# Patient Record
Sex: FEMALE | Race: WHITE | NOT HISPANIC OR LATINO | Employment: FULL TIME | URBAN - METROPOLITAN AREA
[De-identification: names, ages, dates, MRNs, and addresses within clinical notes are randomized per-mention and may not be internally consistent; named-entity substitution may affect disease eponyms.]

---
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
## 2024-01-09 LAB
HEPATITIS B SURFACE ANTIGEN (EXTERNAL): NEGATIVE
HIV1+2 AB SERPL QL IA: NEGATIVE
RUBELLA ANTIBODY IGG (EXTERNAL): NORMAL
TREPONEMA PALLIDUM ANTIBODY (EXTERNAL): NONREACTIVE

## 2024-04-16 ENCOUNTER — HOSPITAL ENCOUNTER (EMERGENCY)
Facility: CLINIC | Age: 24
Discharge: HOME | End: 2024-04-16
Attending: EMERGENCY MEDICINE | Admitting: EMERGENCY MEDICINE
Payer: COMMERCIAL

## 2024-04-16 VITALS
SYSTOLIC BLOOD PRESSURE: 123 MMHG | OXYGEN SATURATION: 96 % | TEMPERATURE: 98.9 F | DIASTOLIC BLOOD PRESSURE: 83 MMHG | RESPIRATION RATE: 16 BRPM | HEART RATE: 88 BPM

## 2024-04-16 DIAGNOSIS — H66.92 ACUTE LEFT OTITIS MEDIA: ICD-10-CM

## 2024-04-16 PROCEDURE — 99283 EMERGENCY DEPT VISIT LOW MDM: CPT

## 2024-04-16 ASSESSMENT — COLUMBIA-SUICIDE SEVERITY RATING SCALE - C-SSRS
6. HAVE YOU EVER DONE ANYTHING, STARTED TO DO ANYTHING, OR PREPARED TO DO ANYTHING TO END YOUR LIFE?: NO
2. HAVE YOU ACTUALLY HAD ANY THOUGHTS OF KILLING YOURSELF IN THE PAST MONTH?: NO
1. IN THE PAST MONTH, HAVE YOU WISHED YOU WERE DEAD OR WISHED YOU COULD GO TO SLEEP AND NOT WAKE UP?: NO

## 2024-04-16 ASSESSMENT — ACTIVITIES OF DAILY LIVING (ADL): ADLS_ACUITY_SCORE: 33

## 2024-04-16 NOTE — ED TRIAGE NOTES
Pt is , presents with ear ache. Ear pain started , yesterday started to progressively getting worsen, feeling hearing has been worsening to the left ear. Currently pregnant 23 weeks, OB check normal in last visit per pt. ABC intact

## 2024-04-16 NOTE — ED PROVIDER NOTES
History     Chief Complaint:  Otalgia       HPI   Lamberto Hernandez is a 23 year old female who presents with left ear pain for 2 days.  States pain has been worsening.  Denies fevers.  States that her hearing has diminished over the last day or so.  Denies history of frequent ear infections.  States that she is currently 23 weeks pregnant, otherwise denies any chronic medical issues.  She has been taking Tylenol for her pain.      Independent Historian:   None - Patient Only    Review of External Notes:   Telephone note from today reviewed where the patient called for ear pain since 4/14/2024.      Medications:    amoxicillin-clavulanate (AUGMENTIN) 875-125 MG tablet  acetaminophen (TYLENOL) 325 MG tablet  albuterol (PROAIR HFA/PROVENTIL HFA/VENTOLIN HFA) 108 (90 Base) MCG/ACT inhaler  cyclobenzaprine (FLEXERIL) 5 MG tablet  ibuprofen (ADVIL/MOTRIN) 200 MG tablet  oxyCODONE IR (ROXICODONE) 5 MG tablet        Past Medical History:    No past medical history on file.    Past Surgical History:    No past surgical history on file.     Physical Exam   Patient Vitals for the past 24 hrs:   BP Temp Temp src Pulse Resp SpO2   04/16/24 0523 123/83 98.9  F (37.2  C) Oral 88 16 96 %        Physical Exam    General: Sitting on the ED chair  HEENT: Normocephalic, atraumatic, left TM bulge and injection, right TM clear  Cardiac: Warm and well perfused  Pulm: Breathing comfortably, no accessory muscle usage, no conversational dyspnea  MSK: No bony deformities  Skin: Warm and dry  Neuro: Moves all extremities  Psych: Pleasant mood and affect      Emergency Department Course     Emergency Department Course & Assessments:    Interventions:  Medications - No data to display     Assessments:  0845 initial evaluation    Independent Interpretation (X-rays, CTs, rhythm strip):  None    Consultations/Discussion of Management or Tests:  None        Social Determinants of Health affecting care:   None    Disposition:  The patient was  discharged.     Impression & Plan    CMS Diagnoses: None       Medical Decision Makin-year old pregnant female presents with left ear pain and diminished hearing as above.  Vital signs are stable.  Nontoxic on exam.  Exam is consistent with left otitis media.  Plan to treat for the same.  Patient discharged home, recommended PCP follow-up for further care.      Diagnosis:    ICD-10-CM    1. Acute left otitis media  H66.92            Discharge Medications:  New Prescriptions    AMOXICILLIN-CLAVULANATE (AUGMENTIN) 875-125 MG TABLET    Take 1 tablet by mouth 2 times daily for 10 days        2024   Kev Thompson MD King, Colin, MD  [phone]

## 2024-07-25 LAB — GROUP B STREPTOCOCCUS (EXTERNAL): NEGATIVE

## 2024-08-19 ENCOUNTER — HOSPITAL ENCOUNTER (INPATIENT)
Facility: CLINIC | Age: 24
LOS: 2 days | Discharge: HOME | End: 2024-08-21
Attending: STUDENT IN AN ORGANIZED HEALTH CARE EDUCATION/TRAINING PROGRAM | Admitting: STUDENT IN AN ORGANIZED HEALTH CARE EDUCATION/TRAINING PROGRAM
Payer: COMMERCIAL

## 2024-08-19 LAB
ABO/RH(D): NORMAL
ANTIBODY SCREEN: NEGATIVE
ERYTHROCYTE [DISTWIDTH] IN BLOOD BY AUTOMATED COUNT: 13.5 % (ref 10–15)
HCT VFR BLD AUTO: 37 % (ref 35–47)
HGB BLD-MCNC: 12.4 G/DL (ref 11.7–15.7)
MCH RBC QN AUTO: 30.8 PG (ref 26.5–33)
MCHC RBC AUTO-ENTMCNC: 33.5 G/DL (ref 31.5–36.5)
MCV RBC AUTO: 92 FL (ref 78–100)
PLATELET # BLD AUTO: 171 10E3/UL (ref 150–450)
RBC # BLD AUTO: 4.03 10E6/UL (ref 3.8–5.2)
SPECIMEN EXPIRATION DATE: NORMAL
WBC # BLD AUTO: 12 10E3/UL (ref 4–11)

## 2024-08-19 PROCEDURE — 250N000013 HC RX MED GY IP 250 OP 250 PS 637: Performed by: STUDENT IN AN ORGANIZED HEALTH CARE EDUCATION/TRAINING PROGRAM

## 2024-08-19 PROCEDURE — 85027 COMPLETE CBC AUTOMATED: CPT | Performed by: STUDENT IN AN ORGANIZED HEALTH CARE EDUCATION/TRAINING PROGRAM

## 2024-08-19 PROCEDURE — 86780 TREPONEMA PALLIDUM: CPT | Performed by: STUDENT IN AN ORGANIZED HEALTH CARE EDUCATION/TRAINING PROGRAM

## 2024-08-19 PROCEDURE — 86900 BLOOD TYPING SEROLOGIC ABO: CPT | Performed by: STUDENT IN AN ORGANIZED HEALTH CARE EDUCATION/TRAINING PROGRAM

## 2024-08-19 PROCEDURE — 120N000001 HC R&B MED SURG/OB

## 2024-08-19 RX ORDER — CITRIC ACID/SODIUM CITRATE 334-500MG
30 SOLUTION, ORAL ORAL ONCE
Status: DISCONTINUED | OUTPATIENT
Start: 2024-08-19 | End: 2024-08-20 | Stop reason: HOSPADM

## 2024-08-19 RX ORDER — NALOXONE HYDROCHLORIDE 0.4 MG/ML
0.2 INJECTION, SOLUTION INTRAMUSCULAR; INTRAVENOUS; SUBCUTANEOUS
Status: DISCONTINUED | OUTPATIENT
Start: 2024-08-19 | End: 2024-08-20 | Stop reason: HOSPADM

## 2024-08-19 RX ORDER — MISOPROSTOL 200 UG/1
800 TABLET ORAL
Status: DISCONTINUED | OUTPATIENT
Start: 2024-08-19 | End: 2024-08-20 | Stop reason: HOSPADM

## 2024-08-19 RX ORDER — TERBUTALINE SULFATE 1 MG/ML
0.25 INJECTION, SOLUTION SUBCUTANEOUS
Status: DISCONTINUED | OUTPATIENT
Start: 2024-08-19 | End: 2024-08-20 | Stop reason: HOSPADM

## 2024-08-19 RX ORDER — ONDANSETRON 2 MG/ML
4 INJECTION INTRAMUSCULAR; INTRAVENOUS EVERY 6 HOURS PRN
Status: DISCONTINUED | OUTPATIENT
Start: 2024-08-19 | End: 2024-08-20 | Stop reason: HOSPADM

## 2024-08-19 RX ORDER — METHYLERGONOVINE MALEATE 0.2 MG/ML
200 INJECTION INTRAVENOUS
Status: DISCONTINUED | OUTPATIENT
Start: 2024-08-19 | End: 2024-08-20 | Stop reason: HOSPADM

## 2024-08-19 RX ORDER — FENTANYL CITRATE 50 UG/ML
100 INJECTION, SOLUTION INTRAMUSCULAR; INTRAVENOUS
Status: DISCONTINUED | OUTPATIENT
Start: 2024-08-19 | End: 2024-08-20 | Stop reason: HOSPADM

## 2024-08-19 RX ORDER — HYDROXYZINE HYDROCHLORIDE 50 MG/1
50 TABLET, FILM COATED ORAL
Status: DISCONTINUED | OUTPATIENT
Start: 2024-08-19 | End: 2024-08-20 | Stop reason: HOSPADM

## 2024-08-19 RX ORDER — KETOROLAC TROMETHAMINE 30 MG/ML
30 INJECTION, SOLUTION INTRAMUSCULAR; INTRAVENOUS
Status: COMPLETED | OUTPATIENT
Start: 2024-08-19 | End: 2024-08-20

## 2024-08-19 RX ORDER — OXYTOCIN 10 [USP'U]/ML
10 INJECTION, SOLUTION INTRAMUSCULAR; INTRAVENOUS
Status: DISCONTINUED | OUTPATIENT
Start: 2024-08-19 | End: 2024-08-20 | Stop reason: ALTCHOICE

## 2024-08-19 RX ORDER — ONDANSETRON 4 MG/1
4 TABLET, ORALLY DISINTEGRATING ORAL EVERY 6 HOURS PRN
Status: DISCONTINUED | OUTPATIENT
Start: 2024-08-19 | End: 2024-08-20 | Stop reason: HOSPADM

## 2024-08-19 RX ORDER — ACETAMINOPHEN 325 MG/1
650 TABLET ORAL EVERY 4 HOURS PRN
Status: DISCONTINUED | OUTPATIENT
Start: 2024-08-19 | End: 2024-08-20 | Stop reason: HOSPADM

## 2024-08-19 RX ORDER — OXYTOCIN 10 [USP'U]/ML
10 INJECTION, SOLUTION INTRAMUSCULAR; INTRAVENOUS
Status: DISCONTINUED | OUTPATIENT
Start: 2024-08-19 | End: 2024-08-20 | Stop reason: HOSPADM

## 2024-08-19 RX ORDER — CARBOPROST TROMETHAMINE 250 UG/ML
250 INJECTION, SOLUTION INTRAMUSCULAR
Status: DISCONTINUED | OUTPATIENT
Start: 2024-08-19 | End: 2024-08-20 | Stop reason: HOSPADM

## 2024-08-19 RX ORDER — TRANEXAMIC ACID 10 MG/ML
1 INJECTION, SOLUTION INTRAVENOUS EVERY 30 MIN PRN
Status: DISCONTINUED | OUTPATIENT
Start: 2024-08-19 | End: 2024-08-20 | Stop reason: HOSPADM

## 2024-08-19 RX ORDER — LOPERAMIDE HCL 2 MG
2 CAPSULE ORAL
Status: DISCONTINUED | OUTPATIENT
Start: 2024-08-19 | End: 2024-08-20 | Stop reason: HOSPADM

## 2024-08-19 RX ORDER — OXYTOCIN/0.9 % SODIUM CHLORIDE 30/500 ML
100-340 PLASTIC BAG, INJECTION (ML) INTRAVENOUS CONTINUOUS PRN
Status: DISCONTINUED | OUTPATIENT
Start: 2024-08-19 | End: 2024-08-20 | Stop reason: ALTCHOICE

## 2024-08-19 RX ORDER — LOPERAMIDE HCL 2 MG
4 CAPSULE ORAL
Status: DISCONTINUED | OUTPATIENT
Start: 2024-08-19 | End: 2024-08-20 | Stop reason: HOSPADM

## 2024-08-19 RX ORDER — METOCLOPRAMIDE HYDROCHLORIDE 5 MG/ML
10 INJECTION INTRAMUSCULAR; INTRAVENOUS EVERY 6 HOURS PRN
Status: DISCONTINUED | OUTPATIENT
Start: 2024-08-19 | End: 2024-08-20 | Stop reason: HOSPADM

## 2024-08-19 RX ORDER — OXYTOCIN/0.9 % SODIUM CHLORIDE 30/500 ML
340 PLASTIC BAG, INJECTION (ML) INTRAVENOUS CONTINUOUS PRN
Status: DISCONTINUED | OUTPATIENT
Start: 2024-08-19 | End: 2024-08-20 | Stop reason: HOSPADM

## 2024-08-19 RX ORDER — VITAMIN A, VITAMIN C, VITAMIN D-3, VITAMIN E, VITAMIN B-1, VITAMIN B-2, NIACIN, VITAMIN B-6, CALCIUM, IRON, ZINC, COPPER 4000; 120; 400; 22; 1.84; 3; 20; 10; 1; 12; 200; 27; 25; 2 [IU]/1; MG/1; [IU]/1; MG/1; MG/1; MG/1; MG/1; MG/1; MG/1; UG/1; MG/1; MG/1; MG/1; MG/1
TABLET ORAL DAILY
COMMUNITY

## 2024-08-19 RX ORDER — ASPIRIN 81 MG/1
81 TABLET ORAL DAILY
Status: ON HOLD | COMMUNITY
End: 2024-08-20

## 2024-08-19 RX ORDER — PROCHLORPERAZINE 25 MG
25 SUPPOSITORY, RECTAL RECTAL EVERY 12 HOURS PRN
Status: DISCONTINUED | OUTPATIENT
Start: 2024-08-19 | End: 2024-08-20 | Stop reason: HOSPADM

## 2024-08-19 RX ORDER — NALOXONE HYDROCHLORIDE 0.4 MG/ML
0.4 INJECTION, SOLUTION INTRAMUSCULAR; INTRAVENOUS; SUBCUTANEOUS
Status: DISCONTINUED | OUTPATIENT
Start: 2024-08-19 | End: 2024-08-20 | Stop reason: HOSPADM

## 2024-08-19 RX ORDER — MISOPROSTOL 200 UG/1
400 TABLET ORAL
Status: DISCONTINUED | OUTPATIENT
Start: 2024-08-19 | End: 2024-08-20 | Stop reason: HOSPADM

## 2024-08-19 RX ORDER — MISOPROSTOL 100 UG/1
25 TABLET ORAL
Status: DISCONTINUED | OUTPATIENT
Start: 2024-08-19 | End: 2024-08-20 | Stop reason: HOSPADM

## 2024-08-19 RX ORDER — SODIUM CHLORIDE, SODIUM LACTATE, POTASSIUM CHLORIDE, CALCIUM CHLORIDE 600; 310; 30; 20 MG/100ML; MG/100ML; MG/100ML; MG/100ML
INJECTION, SOLUTION INTRAVENOUS CONTINUOUS
Status: DISCONTINUED | OUTPATIENT
Start: 2024-08-19 | End: 2024-08-20 | Stop reason: HOSPADM

## 2024-08-19 RX ORDER — CITRIC ACID/SODIUM CITRATE 334-500MG
30 SOLUTION, ORAL ORAL
Status: DISCONTINUED | OUTPATIENT
Start: 2024-08-19 | End: 2024-08-20 | Stop reason: HOSPADM

## 2024-08-19 RX ORDER — PROCHLORPERAZINE MALEATE 10 MG
10 TABLET ORAL EVERY 6 HOURS PRN
Status: DISCONTINUED | OUTPATIENT
Start: 2024-08-19 | End: 2024-08-20 | Stop reason: HOSPADM

## 2024-08-19 RX ORDER — IBUPROFEN 800 MG/1
800 TABLET, FILM COATED ORAL
Status: COMPLETED | OUTPATIENT
Start: 2024-08-19 | End: 2024-08-20

## 2024-08-19 RX ORDER — METOCLOPRAMIDE 10 MG/1
10 TABLET ORAL EVERY 6 HOURS PRN
Status: DISCONTINUED | OUTPATIENT
Start: 2024-08-19 | End: 2024-08-20 | Stop reason: HOSPADM

## 2024-08-19 RX ADMIN — MISOPROSTOL 25 MCG: 100 TABLET ORAL at 17:50

## 2024-08-19 RX ADMIN — HYDROXYZINE HYDROCHLORIDE 50 MG: 50 TABLET, FILM COATED ORAL at 22:01

## 2024-08-19 RX ADMIN — MISOPROSTOL 25 MCG: 100 TABLET ORAL at 22:00

## 2024-08-19 RX ADMIN — MISOPROSTOL 25 MCG: 100 TABLET ORAL at 20:05

## 2024-08-19 ASSESSMENT — ACTIVITIES OF DAILY LIVING (ADL)
ADLS_ACUITY_SCORE: 18
ADLS_ACUITY_SCORE: 35
ADLS_ACUITY_SCORE: 18

## 2024-08-19 NOTE — PLAN OF CARE
Pt sent from clinic for possible ripening/induction- audible decel's in the clinic. Pt feels baby moving-place on monitor- will page MD for orders

## 2024-08-19 NOTE — H&P
[facility]   Labor & Delivery H&P    Lamberto Hernandez YOB: 2000   MRN [id number] Primary OB: [facility] HPI   Lamberto Hernandez is a 24 year old  at 40w4d admitted after nonreassuring NST in clinic today with 3 subtle decelerations. Overall feeling well, reports good fetal movement. No vaginal bleeding or leaking fluid.      PREGNANCY HISTORY   OB PROBLEM LIST  #. Asthma  #. Family Hx Pre E    OB History    Para Term  AB Living   1 0 0 0 0 0   SAB IAB Ectopic Multiple Live Births   0 0 0 0 0      # Outcome Date GA Lbr Heri/2nd Weight Sex Type Anes PTL Lv   1 Current              MATERNAL MEDICAL HISTORY     Past Medical History:   Diagnosis Date    Allergic reaction     PEANUTS- -anaphylactic    Uncomplicated asthma      History reviewed. No pertinent surgical history.    History reviewed. No pertinent family history.    Social History     Tobacco Use    Smoking status: Never   Substance Use Topics    Alcohol use: Never     Medications Prior to Admission   Medication Sig Dispense Refill Last Dose    acetaminophen (TYLENOL) 325 MG tablet Take 2 tablets (650 mg) by mouth every 4 hours as needed for mild pain 100 tablet 0 2024    albuterol (PROAIR HFA/PROVENTIL HFA/VENTOLIN HFA) 108 (90 Base) MCG/ACT inhaler Inhale 2 puffs into the lungs as needed for shortness of breath / dyspnea or wheezing   More than a month    aspirin 81 MG EC tablet Take 81 mg by mouth daily   2024    Prenatal Vit-Fe Fumarate-FA (PRENATAL MULTIVITAMIN  PLUS IRON) 27-1 MG TABS Take by mouth daily   2024     Allergies   Allergen Reactions    Eggs [Chicken-Derived Products (Egg)] Unknown    Grass Hives    Peanuts [Nuts] Hives    Pollen Extract Other (See Comments)    Tomato Unknown      OBJECTIVE     Vitals:    24 1623 24 1634 24 1638   BP: 136/68     Resp:   16   Temp:   98  F (36.7  C)   TempSrc:   Axillary   Weight:  93.4 kg (206 lb)    Height:  1.797 m  "(5' 10.75\")      Physical Exam  General: Alert, in no acute distress, resting comfortably in bed.   Neuro: Grossly normal to observation.  Psych: Alert, oriented, affect appropriate.  Cardiovascular: Normal rate, wwp.   Respiratory: Nonlabored breathing, equal chest rise/fall bilaterally.   Abdomen: Gravid, nontender.   Skin: Color, texture, turgor normal. No concerning rashes or lesions.    SVE Trend  1700 /-3    Fetal Monitoring  FHT: baseline 130, moderate variability, 15x15 accels, no decels  [place]: irregular    ASSESSMENT & PLAN   Lamberto Hernandez is a 24 year old  at 40w4d by LMP c/w 8wk US admitted for induction of labor.    #. Induction of Labor:   - IOL for nonreassuring fetal monitoring at term, impending late term pregnancy. Fetal monitoring now reassuring.   - Risks, benefits, alternatives of IOL again explained. Reviewed risk of fetal intolerance of labor, Lamberto is open to  delivery if indicated. All questions answered.   - Initial Tobar score unfavorable, will proceed with PO Misoprostol for cervical ripening.   - Pitocin per protocol for augmentation once sufficiently ripened. AROM as indicated.   - General diet, up ad juan. Pain control per patient preference.   - PPH Risk: elevated (induction), Hemabate contraindicated due to history of asthma    #. Fetal Well Being:   - Cephalic, GBS negative  - Continuous external fetal monitoring    #. Prenatal Care:   - OB labs reviewed: A, Rubella immune, Heb B Ag non-reactive, HIV negative, RPR negative  - Genetics: NIPS neg  - Anatomy ultrasound: normal   - Rh positive, Rhogam not indicated  - GCT 84, along with hgb 11.5, RPR NR  - declined flu and COVID vaccines. S/p Tdap 24  - GBS negative  - Feed: breast and bottle, has script  - Contraception: likely COCPs, but unsure if she will use contraception. Discussed IPI of >18 months   - Peds: likely [facility]     #. Asthma:  - Rare, triggers are dog dander   - Avoid Hemabate    #. " Disposition: inpatient    Lauren MacNeill, MD [facility]  [phone]  08/19/2024 5:52 PM

## 2024-08-19 NOTE — PROVIDER NOTIFICATION
08/19/24 1726   Provider Notification   Provider Name/Title Clement   Method of Notification At Bedside   Request Evaluate in Person   Notification Reason Patient Arrived;Status Update     MD at bedside to review plan of care, answer questions

## 2024-08-20 LAB — T PALLIDUM AB SER QL: NONREACTIVE

## 2024-08-20 PROCEDURE — 120N000001 HC R&B MED SURG/OB

## 2024-08-20 PROCEDURE — 250N000013 HC RX MED GY IP 250 OP 250 PS 637: Performed by: OBSTETRICS & GYNECOLOGY

## 2024-08-20 PROCEDURE — 250N000011 HC RX IP 250 OP 636: Performed by: STUDENT IN AN ORGANIZED HEALTH CARE EDUCATION/TRAINING PROGRAM

## 2024-08-20 PROCEDURE — 258N000003 HC RX IP 258 OP 636: Performed by: OBSTETRICS & GYNECOLOGY

## 2024-08-20 PROCEDURE — 722N000001 HC LABOR CARE VAGINAL DELIVERY SINGLE

## 2024-08-20 PROCEDURE — 250N000009 HC RX 250: Performed by: STUDENT IN AN ORGANIZED HEALTH CARE EDUCATION/TRAINING PROGRAM

## 2024-08-20 PROCEDURE — 250N000013 HC RX MED GY IP 250 OP 250 PS 637: Performed by: STUDENT IN AN ORGANIZED HEALTH CARE EDUCATION/TRAINING PROGRAM

## 2024-08-20 RX ORDER — MISOPROSTOL 200 UG/1
400 TABLET ORAL
Status: DISCONTINUED | OUTPATIENT
Start: 2024-08-20 | End: 2024-08-21 | Stop reason: HOSPADM

## 2024-08-20 RX ORDER — FENTANYL CITRATE-0.9 % NACL/PF 10 MCG/ML
100 PLASTIC BAG, INJECTION (ML) INTRAVENOUS EVERY 5 MIN PRN
Status: DISCONTINUED | OUTPATIENT
Start: 2024-08-20 | End: 2024-08-20 | Stop reason: HOSPADM

## 2024-08-20 RX ORDER — IBUPROFEN 800 MG/1
800 TABLET, FILM COATED ORAL EVERY 6 HOURS PRN
Status: DISCONTINUED | OUTPATIENT
Start: 2024-08-20 | End: 2024-08-21 | Stop reason: HOSPADM

## 2024-08-20 RX ORDER — OXYTOCIN 10 [USP'U]/ML
10 INJECTION, SOLUTION INTRAMUSCULAR; INTRAVENOUS
Status: DISCONTINUED | OUTPATIENT
Start: 2024-08-20 | End: 2024-08-21 | Stop reason: HOSPADM

## 2024-08-20 RX ORDER — OXYTOCIN/0.9 % SODIUM CHLORIDE 30/500 ML
340 PLASTIC BAG, INJECTION (ML) INTRAVENOUS CONTINUOUS PRN
Status: DISCONTINUED | OUTPATIENT
Start: 2024-08-20 | End: 2024-08-21 | Stop reason: HOSPADM

## 2024-08-20 RX ORDER — BISACODYL 10 MG
10 SUPPOSITORY, RECTAL RECTAL DAILY PRN
Status: DISCONTINUED | OUTPATIENT
Start: 2024-08-20 | End: 2024-08-21 | Stop reason: HOSPADM

## 2024-08-20 RX ORDER — TRANEXAMIC ACID 10 MG/ML
1 INJECTION, SOLUTION INTRAVENOUS EVERY 30 MIN PRN
Status: DISCONTINUED | OUTPATIENT
Start: 2024-08-20 | End: 2024-08-21 | Stop reason: HOSPADM

## 2024-08-20 RX ORDER — CARBOPROST TROMETHAMINE 250 UG/ML
250 INJECTION, SOLUTION INTRAMUSCULAR
Status: DISCONTINUED | OUTPATIENT
Start: 2024-08-20 | End: 2024-08-21 | Stop reason: HOSPADM

## 2024-08-20 RX ORDER — METHYLERGONOVINE MALEATE 0.2 MG/ML
200 INJECTION INTRAVENOUS
Status: DISCONTINUED | OUTPATIENT
Start: 2024-08-20 | End: 2024-08-21 | Stop reason: HOSPADM

## 2024-08-20 RX ORDER — LOPERAMIDE HCL 2 MG
2 CAPSULE ORAL
Status: DISCONTINUED | OUTPATIENT
Start: 2024-08-20 | End: 2024-08-21 | Stop reason: HOSPADM

## 2024-08-20 RX ORDER — NALOXONE HYDROCHLORIDE 0.4 MG/ML
0.2 INJECTION, SOLUTION INTRAMUSCULAR; INTRAVENOUS; SUBCUTANEOUS
Status: DISCONTINUED | OUTPATIENT
Start: 2024-08-20 | End: 2024-08-21 | Stop reason: HOSPADM

## 2024-08-20 RX ORDER — MISOPROSTOL 200 UG/1
800 TABLET ORAL
Status: DISCONTINUED | OUTPATIENT
Start: 2024-08-20 | End: 2024-08-21 | Stop reason: HOSPADM

## 2024-08-20 RX ORDER — NALBUPHINE HYDROCHLORIDE 10 MG/ML
2.5-5 INJECTION, SOLUTION INTRAMUSCULAR; INTRAVENOUS; SUBCUTANEOUS EVERY 6 HOURS PRN
Status: DISCONTINUED | OUTPATIENT
Start: 2024-08-20 | End: 2024-08-21 | Stop reason: HOSPADM

## 2024-08-20 RX ORDER — HYDROCORTISONE 25 MG/G
CREAM TOPICAL 3 TIMES DAILY PRN
Status: DISCONTINUED | OUTPATIENT
Start: 2024-08-20 | End: 2024-08-21 | Stop reason: HOSPADM

## 2024-08-20 RX ORDER — NALOXONE HYDROCHLORIDE 0.4 MG/ML
0.4 INJECTION, SOLUTION INTRAMUSCULAR; INTRAVENOUS; SUBCUTANEOUS
Status: DISCONTINUED | OUTPATIENT
Start: 2024-08-20 | End: 2024-08-21 | Stop reason: HOSPADM

## 2024-08-20 RX ORDER — LOPERAMIDE HCL 2 MG
4 CAPSULE ORAL
Status: DISCONTINUED | OUTPATIENT
Start: 2024-08-20 | End: 2024-08-21 | Stop reason: HOSPADM

## 2024-08-20 RX ORDER — LIDOCAINE HYDROCHLORIDE 10 MG/ML
INJECTION, SOLUTION EPIDURAL; INFILTRATION; INTRACAUDAL; PERINEURAL
Status: DISCONTINUED
Start: 2024-08-20 | End: 2024-08-20 | Stop reason: WASHOUT

## 2024-08-20 RX ORDER — ACETAMINOPHEN 325 MG/1
650 TABLET ORAL EVERY 4 HOURS PRN
Status: DISCONTINUED | OUTPATIENT
Start: 2024-08-20 | End: 2024-08-21 | Stop reason: HOSPADM

## 2024-08-20 RX ORDER — MODIFIED LANOLIN
OINTMENT (GRAM) TOPICAL
Status: DISCONTINUED | OUTPATIENT
Start: 2024-08-20 | End: 2024-08-21 | Stop reason: HOSPADM

## 2024-08-20 RX ORDER — DOCUSATE SODIUM 100 MG/1
100 CAPSULE, LIQUID FILLED ORAL DAILY
Status: DISCONTINUED | OUTPATIENT
Start: 2024-08-20 | End: 2024-08-21 | Stop reason: HOSPADM

## 2024-08-20 RX ADMIN — MISOPROSTOL 25 MCG: 100 TABLET ORAL at 00:08

## 2024-08-20 RX ADMIN — SODIUM CHLORIDE, POTASSIUM CHLORIDE, SODIUM LACTATE AND CALCIUM CHLORIDE 1000 ML: 600; 310; 30; 20 INJECTION, SOLUTION INTRAVENOUS at 07:04

## 2024-08-20 RX ADMIN — Medication 340 ML/HR: at 11:22

## 2024-08-20 RX ADMIN — FENTANYL CITRATE 100 MCG: 0.05 INJECTION, SOLUTION INTRAMUSCULAR; INTRAVENOUS at 10:09

## 2024-08-20 RX ADMIN — KETOROLAC TROMETHAMINE 30 MG: 30 INJECTION, SOLUTION INTRAMUSCULAR at 14:03

## 2024-08-20 RX ADMIN — MISOPROSTOL 25 MCG: 100 TABLET ORAL at 02:13

## 2024-08-20 RX ADMIN — MISOPROSTOL 25 MCG: 100 TABLET ORAL at 09:30

## 2024-08-20 RX ADMIN — FENTANYL CITRATE 100 MCG: 0.05 INJECTION, SOLUTION INTRAMUSCULAR; INTRAVENOUS at 11:26

## 2024-08-20 RX ADMIN — MISOPROSTOL 25 MCG: 100 TABLET ORAL at 04:32

## 2024-08-20 RX ADMIN — MISOPROSTOL 25 MCG: 100 TABLET ORAL at 07:16

## 2024-08-20 RX ADMIN — DOCUSATE SODIUM 100 MG: 100 CAPSULE, LIQUID FILLED ORAL at 15:02

## 2024-08-20 ASSESSMENT — ACTIVITIES OF DAILY LIVING (ADL)
ADLS_ACUITY_SCORE: 18

## 2024-08-20 NOTE — PROVIDER NOTIFICATION
08/20/24 0700   Provider Notification   Provider Name/Title    Method of Notification In Department   Notification Reason Status Update        updated on FHR tracing, tracing reviewed with MD. Plan for 1L LR bolus and SVE. Will update MD with SVE.

## 2024-08-20 NOTE — PLAN OF CARE
VSS on room air. Fundus/lochia/incision WDL. Voiding appropriately and ambulating independently. Pain adequately controlled with PRN medications, gave one dose of toradol shortly after transfer to . Also using ice and tucks. Formula feeding infant, 10mL each feed. S/o at bedside, supportive. Parents interactive with infant and attentive to her cues.    Goal Outcome Evaluation:      Plan of Care Reviewed With: patient    Overall Patient Progress: improving    Problem: Adult Inpatient Plan of Care  Goal: Optimal Comfort and Wellbeing  Outcome: Progressing  Intervention: Monitor Pain and Promote Comfort  Recent Flowsheet Documentation  Taken 8/20/2024 1423 by Jessenia Ramirez, RN  Pain Management Interventions:   pain management plan reviewed with patient/caregiver   medication offered but refused  Taken 8/20/2024 1403 by Jessenia Ramirez, RN  Pain Management Interventions:   medication (see MAR)   heat applied     Problem: Adult Inpatient Plan of Care  Goal: Readiness for Transition of Care  Outcome: Progressing     Problem: Postpartum (Vaginal Delivery)  Goal: Optimal Pain Control and Function  Outcome: Progressing  Intervention: Prevent or Manage Pain  Recent Flowsheet Documentation  Taken 8/20/2024 1423 by Jessenia Ramirez, RN  Pain Management Interventions:   pain management plan reviewed with patient/caregiver   medication offered but refused  Taken 8/20/2024 1403 by Jessenia Ramirez, RN  Pain Management Interventions:   medication (see MAR)   heat applied

## 2024-08-20 NOTE — DISCHARGE SUMMARY
[facility] Discharge Summary    Lamberto Hernandez MRN# [id number]   Age: 24 year old YOB: 2000     Date of Admission:  2024  Date of Discharge::  2024  Admitting Physician:  Chayo Crawford MD  Discharge Physician:  Yelitza Whitman MD          Admission Diagnoses:   -IUP at 40w5d          Discharge Diagnosis:     -IUP at 40w5d, now delivered          Procedures:     Procedure(s): -            Medications Prior to Admission:     Medications Prior to Admission   Medication Sig Dispense Refill Last Dose    acetaminophen (TYLENOL) 325 MG tablet Take 2 tablets (650 mg) by mouth every 4 hours as needed for mild pain 100 tablet 0 2024    albuterol (PROAIR HFA/PROVENTIL HFA/VENTOLIN HFA) 108 (90 Base) MCG/ACT inhaler Inhale 2 puffs into the lungs as needed for shortness of breath / dyspnea or wheezing   More than a month    aspirin 81 MG EC tablet Take 81 mg by mouth daily   2024    Prenatal Vit-Fe Fumarate-FA (PRENATAL MULTIVITAMIN  PLUS IRON) 27-1 MG TABS Take by mouth daily   2024             Discharge Medications:     Current Discharge Medication List        CONTINUE these medications which have NOT CHANGED    Details   acetaminophen (TYLENOL) 325 MG tablet Take 2 tablets (650 mg) by mouth every 4 hours as needed for mild pain  Qty: 100 tablet, Refills: 0    Associated Diagnoses: Neck sprain, initial encounter      albuterol (PROAIR HFA/PROVENTIL HFA/VENTOLIN HFA) 108 (90 Base) MCG/ACT inhaler Inhale 2 puffs into the lungs as needed for shortness of breath / dyspnea or wheezing      aspirin 81 MG EC tablet Take 81 mg by mouth daily      Prenatal Vit-Fe Fumarate-FA (PRENATAL MULTIVITAMIN  PLUS IRON) 27-1 MG TABS Take by mouth daily                  Brief Admission History   Lamberto Hernandez is a 24 year old  at 40w4d admitted after nonreassuring NST in clinic today with 3 subtle decelerations. Overall feeling well, reports good fetal movement. No vaginal  bleeding or leaking fluid.     Lamberto Hernandez is a 24 year old  at 40w4d by LMP c/w 8wk US admitted for induction of labor.     #. Induction of Labor:   - IOL for nonreassuring fetal monitoring at term, impending late term pregnancy. Fetal monitoring now reassuring.   - Risks, benefits, alternatives of IOL again explained. Reviewed risk of fetal intolerance of labor, Lamberto is open to  delivery if indicated. All questions answered.   - Initial Tobar score unfavorable, will proceed with PO Misoprostol for cervical ripening.   - Pitocin per protocol for augmentation once sufficiently ripened. AROM as indicated.   - General diet, up ad juan. Pain control per patient preference.   - PPH Risk: elevated (induction), Hemabate contraindicated due to history of asthma     #. Fetal Well Being:   - Cephalic, GBS negative  - Continuous external fetal monitoring     #. Prenatal Care:   - OB labs reviewed: A, Rubella immune, Heb B Ag non-reactive, HIV negative, RPR negative  - Genetics: NIPS neg  - Anatomy ultrasound: normal   - Rh positive, Rhogam not indicated  - GCT 84, along with hgb 11.5, RPR NR  - declined flu and COVID vaccines. S/p Tdap 24  - GBS negative  - Feed: breast and bottle, has script  - Contraception: likely COCPs, but unsure if she will use contraception. Discussed IPI of >18 months   - Peds: likely [facility]      #. Asthma:  - Rare, triggers are dog dander   - Avoid Hemabate     #. Disposition: inpatient         Brief Intrapartum Course:   Lamberto Hernandez is a 24 year old  who was admitted at 40w5d for nonreassuring NST (spontaneous decels; done in office for late term gestation) .  Pregnancy was complicated by asthma, GBS neg, A pos.  She underwent cervical ripening with cytotec PO.  SROM occurred notable for clera fluid. She progressed to fully and began pushing effectively. Pelvis was noted to be adequate.  She did not receive epidural, as she was found to be complete. She  delivered a viable female infant with APGARs of 9 and 9, respectively.  Head delivered in an DEEP position, restituted to ROT and delivered without difficulty.  Left anterior shoulder delivered first, followed by right posterior shoulder without complication, and then rest of body. Spontaneous delivery of a placenta with a 3-V cord ensued shortly thereafter.  Placenta was examined and noted to be intact. She received IV pitocin as a uterotonic agent.  Exam of the perineum revealed bilateral periurethral lacerations; hemostatic, no repair needed.  EBL 200cc.             Hospital Course:   The patient's hospital course was unremarkable.  On discharge, her pain was well controlled. Vaginal bleeding is similar to peak menstrual flow.  Voiding without difficulty.  Ambulating well and tolerating a normal diet.  No fever.  Formula feeding well.  Infant is stable. She was discharged on post-partum day #1..    Post-partum hemoglobin:   Hemoglobin   Date Value Ref Range Status   08/19/2024 12.4 11.7 - 15.7 g/dL Final   08/14/2018 13.8 11.7 - 15.7 g/dL Final             Discharge Instructions and Follow-Up:     Discharge diet: Regular   Discharge activity: Pelvic rest for 6 weeks including no sexual intercourse, tampons, or douching.   Discharge follow-up: Follow up with your primary OB for a routine postpartum visit in 6 weeks           Discharge Disposition:     Discharged to home     Anne Marie Furuseth, MD [facility]

## 2024-08-20 NOTE — PROGRESS NOTES
Patient is comfortable and resting.  Denies LOF or VB.  +FM.  Contractions are mild.    FHT: 130/mod/+accel/-decel  [place]: q 3-4 min  SVE: 1.5/70/-2, exam per nurse    A&P: 23yo  at 40+5 wga, IOL for spontaneous decels   - IOL: Cont PO cytotec; she has received 7 doses thus far.   - Epidural upon request  - FHT: cat I currently.  She had 3 decels last hour; improved with position change and IV fluid bolus.  Cont CEFM.  No indication for expedited delivery.  - Asthma: avoid Hemabate  - Family h/o preE: Bps normal  - A pos  - GBS neg    - PP contraception: COCPs vs condoms/NFP  - Dispo: anticipate

## 2024-08-20 NOTE — PROVIDER NOTIFICATION
08/20/24 0651   Provider Notification   Provider Name/Title    Method of Notification Electronic Page   Notification Reason Decels;Status Update       Vocera page sent: FHR moderate variability, accels present, occasional variable and late decels throughout night. Since 0620 FHR late decel x3, prolonged decel for 3 mins to [place] of 120bpm. Pt repositioned and no more decels noted with following ctx's. Cytotec x6 doses given, currently holding dose due at 0630.    MD paged back: Let's give a 1L fluid bolus and check her cervix.

## 2024-08-20 NOTE — L&D DELIVERY NOTE
Lamberto Hernandez is a 24 year old  who was admitted at 40w5d for nonreassuring NST (spontaneous decels; done in office for late term gestation) .  Pregnancy was complicated by asthma, GBS neg, A pos.  She underwent cervical ripening with cytotec PO.  SROM occurred notable for clera fluid. She progressed to fully and began pushing effectively. Pelvis was noted to be adequate.  She did not receive epidural, as she was found to be complete. She delivered a viable female infant with APGARs of 9 and 9, respectively.  Head delivered in an DEEP position, restituted to ROT and delivered without difficulty.  Left anterior shoulder delivered first, followed by right posterior shoulder without complication, and then rest of body. Spontaneous delivery of a placenta with a 3-V cord ensued shortly thereafter.  Placenta was examined and noted to be intact. She received IV pitocin as a uterotonic agent.  Exam of the perineum revealed bilateral periurethral lacerations; hemostatic, no repair needed.  EBL 200cc.      Brittnee Lundberg MD   2024, 11:40 AM     Delivery Summary    Lamberto Hernandez MRN# [id number]   Age: 24 year old YOB: 2000          David Female-Lamberto [[id number]]      Labor Event Times      Active labor onset date: 24 Onset time: 10:20 AM   Dilation complete date: 24 Complete time: 11:00 AM   Start pushing date/time: 2024 1111          Labor Events     labor?: No   steroids: None  Labor Type: Induction/Cervical ripening  Predominate monitoring during 1st stage: continuous electronic fetal monitoring       Rupture date/time: 24 1000   Rupture type: Spontaneous Rupture of Membranes  Fluid color: Clear  Fluid odor: Normal     Induction: Misoprostol  Induction date/time:      Cervical ripening date/time: 24 1750         Augmentation: None       Delivery/Placenta Date and Time      Delivery Date: 24 Delivery Time: 11:20 AM   Placenta Date/Time:  2024 11:21 AM  Oxytocin given at the time of delivery: after delivery of baby  Delivering clinician: Brittnee Lundberg MD   Other personnel present at delivery:  Provider Role   May Pop RN Clarens, Johanna, RN              Vaginal Counts       Initial count performed by 2 team members:  Two Team Members   Dr. Toño Olvera, RN         Needles Suture Needles Sponges (RETIRED) Instruments   Initial counts 2  5    Added to count       Relief counts       Final counts               Placed during labor Accounted for at the end of labor   FSE No NA   IUPC No NA   Cervidil No NA                             Apgars    Living status: Living   1 Minute 5 Minute 10 Minute 15 Minute 20 Minute   Skin color: 1  1       Heart rate: 2  2       Reflex irritability: 2  2       Muscle tone: 2  2       Respiratory effort: 2  2       Total: 9  9       Apgars assigned by: LUIZ OLVERA       Cord      Vessels: 3 Vessels    Cord Complications: None               Cord Blood Disposition: Discard    Gases Sent?: No    Delayed cord clamping?: Yes    Cord Clamping Delay (seconds):  seconds    Stem cell collection?: No           Indore Resuscitation    Methods: None       Labor Events and Shoulder Dystocia    Fetal Tracing Prior to Delivery: Category 1  Shoulder dystocia present?: Neg       Delivery (Maternal) (Provider to Complete) ([id number])    Episiotomy: None  Perineal lacerations: None      Periurethral laceration: bilateral Repaired?: No   Est. blood loss (mL): 200  Genital tract inspection done: Pos       Blood Loss  Mother: Lamberto Hernandez #[id number]     Start of Mother's Information      Delivery Blood Loss  24 1020 - 24 1140      EBL (mL) Hospital Encounter 200 mL    Total  200 mL               End of Mother's Information  Mother: Lamberto Hernandez #[id number]                Delivery - Provider to Complete ([id number])    Delivering clinician: Brittnee Lundberg MD  Delivery Type (Choose the 1 that  will go to the Birth History): Vaginal, Spontaneous                         Other personnel:  Provider Role   May Pop RN Clarens, Johanna, RN                     Placenta    Date/Time: 8/20/2024 11:21 AM  Removal: Spontaneous  Disposition: Hospital disposal             Anesthesia    Method: INTRAVENOUS                     Presentation and Position    Presentation: Vertex    Position: Right Occiput Anterior                     Brittnee Lundberg MD

## 2024-08-20 NOTE — PROGRESS NOTES
Data: Patient transferred to  432 via wheelchair at 1400. Baby transferred via mothers arms.  Action: Receiving unit notified of transfer: Yes. Patient and family notified of room change. Report given to Jessenia DEE at 1400. Belongings sent to receiving unit. Accompanied by Registered Nurse. Oriented patient to surroundings. Call light within reach. ID bands double-checked with receiving RN.  Response: Patient tolerated transfer and is stable.

## 2024-08-20 NOTE — PROVIDER NOTIFICATION
08/20/24 1100   Provider Notification   Provider Name/Title Dr. Lundberg   Method of Notification Electronic Page  ([email or web address])   Request Attend Delivery     10/100/+1    MD coming to the unit

## 2024-08-21 VITALS
BODY MASS INDEX: 26.85 KG/M2 | DIASTOLIC BLOOD PRESSURE: 75 MMHG | WEIGHT: 191.8 LBS | HEART RATE: 74 BPM | SYSTOLIC BLOOD PRESSURE: 130 MMHG | HEIGHT: 71 IN | TEMPERATURE: 97.6 F | RESPIRATION RATE: 18 BRPM

## 2024-08-21 LAB — HGB BLD-MCNC: 10.4 G/DL (ref 11.7–15.7)

## 2024-08-21 PROCEDURE — 36415 COLL VENOUS BLD VENIPUNCTURE: CPT | Performed by: OBSTETRICS & GYNECOLOGY

## 2024-08-21 PROCEDURE — 85018 HEMOGLOBIN: CPT | Performed by: OBSTETRICS & GYNECOLOGY

## 2024-08-21 PROCEDURE — 250N000013 HC RX MED GY IP 250 OP 250 PS 637: Performed by: OBSTETRICS & GYNECOLOGY

## 2024-08-21 RX ORDER — IBUPROFEN 800 MG/1
800 TABLET, FILM COATED ORAL EVERY 6 HOURS PRN
Qty: 40 TABLET | Refills: 1 | Status: SHIPPED | OUTPATIENT
Start: 2024-08-21

## 2024-08-21 RX ORDER — DOCUSATE SODIUM 100 MG/1
100 CAPSULE, LIQUID FILLED ORAL DAILY
Qty: 30 CAPSULE | Refills: 0 | Status: SHIPPED | OUTPATIENT
Start: 2024-08-22

## 2024-08-21 RX ORDER — MODIFIED LANOLIN
OINTMENT (GRAM) TOPICAL
Qty: 7 G | Refills: 3 | Status: SHIPPED | OUTPATIENT
Start: 2024-08-21

## 2024-08-21 RX ADMIN — ACETAMINOPHEN 650 MG: 325 TABLET, FILM COATED ORAL at 02:50

## 2024-08-21 RX ADMIN — IBUPROFEN 800 MG: 800 TABLET ORAL at 02:51

## 2024-08-21 RX ADMIN — DOCUSATE SODIUM 100 MG: 100 CAPSULE, LIQUID FILLED ORAL at 08:40

## 2024-08-21 ASSESSMENT — ACTIVITIES OF DAILY LIVING (ADL)
ADLS_ACUITY_SCORE: 18

## 2024-08-21 NOTE — PROGRESS NOTES
"Patient Name:  aLmberto Hernandez   MRN:  [id number]  Age:  24 year old    YOB: 2000      POSTPARTUM PROGRESS NOTE    Pt is PPD#1 s/p vaginal delivery.  She is doing well without complaints.  Pt is ambulating, voiding, tolerating a regular diet.  Pain is well controlled and lochia is within normal limits.  She is formula feeding.  Baby is doing well.    Objective:    Temp:  [97.9  F (36.6  C)-98.1  F (36.7  C)] 98.1  F (36.7  C)  Pulse:  [75-83] 81  Resp:  [16-18] 16  BP: (110-144)/(55-89) 138/66  206 lbs 0 oz    General Appearance:  NAD  Lungs:  unlabored  Cardiovascular:  RRR  Abdomen:  nontender, nondistended  Fundus:  firm, below the umbilicus      Lower extremities:  trace symmetric edema    Lab Review:    ABO/RH(D)   Date Value Ref Range Status   2024 A POS  Final     Hemoglobin   Date Value Ref Range Status   2024 12.4 11.7 - 15.7 g/dL Final   2018 13.8 11.7 - 15.7 g/dL Final     Hematocrit   Date Value Ref Range Status   2024 37.0 35.0 - 47.0 % Final   2018 41.7 35.0 - 47.0 % Final       Lab Results   Component Value Date    WBC 12.0 2024    WBC 14.8 2018     Lab Results   Component Value Date    RBC 4.03 2024    RBC 4.61 2018     Lab Results   Component Value Date    HGB 12.4 2024    HGB 13.8 2018     Lab Results   Component Value Date    HCT 37.0 2024    HCT 41.7 2018     No components found for: \"MCT\"  Lab Results   Component Value Date    MCV 92 2024    MCV 91 2018     Lab Results   Component Value Date    MCH 30.8 2024    MCH 29.9 2018     Lab Results   Component Value Date    MCHC 33.5 2024    MCHC 33.1 2018     Lab Results   Component Value Date    RDW 13.5 2024    RDW 12.6 2018     Lab Results   Component Value Date     2024     2018       Assessment:  23yo  PPD#1 s/p vaginal delivery, doing well.    Plan:   - Postpartum: " recovering well. Pain well controlled. Cont PO pain meds and regular diet. Encourage ambulation.  - Asthma: mild intermittent  - Fam h/o preE: Bps normal with no s/sx of preE  - Contraception: COCPs vs condoms/NFP  - Dispo: anticipate DC PPD#2      Meredith Chan, MD [facility]  August 21, 2024

## 2024-08-21 NOTE — PLAN OF CARE
Goal Outcome Evaluation:      Plan of Care Reviewed With: patient    Overall Patient Progress: improvingOverall Patient Progress: improving         VSS. Fundus firm and midline. Lochia scant. Up ad juan throughout room. Voiding spontaneously. Cramping pain improved with PRN Tylenol and Ibuprofen. Observed holding and bonding with infant. Significant other at bedside assisting with infant cares. Plan of care ongoing. No further concerns as of present.       Problem: Adult Inpatient Plan of Care  Goal: Plan of Care Review  Description: The Plan of Care Review/Shift note should be completed every shift.  The Outcome Evaluation is a brief statement about your assessment that the patient is improving, declining, or no change.  This information will be displayed automatically on your shift  note.  Outcome: Progressing  Flowsheets (Taken 8/21/2024 0540)  Plan of Care Reviewed With: patient  Overall Patient Progress: improving  Goal: Absence of Hospital-Acquired Illness or Injury  Intervention: Prevent Skin Injury  Recent Flowsheet Documentation  Taken 8/21/2024 0230 by Vinicio Alberto RN  Body Position:   position changed independently   position maintained  Taken 8/20/2024 2234 by Vinicio Alberto RN  Body Position:   position changed independently   position maintained  Intervention: Prevent Infection  Recent Flowsheet Documentation  Taken 8/21/2024 0230 by Vinicio Alberto RN  Infection Prevention:   rest/sleep promoted   single patient room provided  Taken 8/20/2024 2234 by Vinicio Alberto RN  Infection Prevention:   rest/sleep promoted   single patient room provided  Goal: Optimal Comfort and Wellbeing  Intervention: Monitor Pain and Promote Comfort  Recent Flowsheet Documentation  Taken 8/21/2024 0250 by Vinicio Alberto RN  Pain Management Interventions: medication (see MAR)  Intervention: Provide Person-Centered Care  Recent Flowsheet Documentation  Taken 8/21/2024 0230 by Vinicio Alberto, LUIZ  Trust  Relationship/Rapport:   care explained   choices provided   questions answered   questions encouraged  Taken 8/20/2024 2234 by Vinicio Alberto RN  Trust Relationship/Rapport:   care explained   choices provided   questions answered   questions encouraged     Problem: Labor  Goal: Stable Fetal Wellbeing  Intervention: Promote and Monitor Fetal Wellbeing  Recent Flowsheet Documentation  Taken 8/21/2024 0230 by Vinicio Alberto RN  Body Position:   position changed independently   position maintained  Taken 8/20/2024 2234 by Vinicio Alberto RN  Body Position:   position changed independently   position maintained  Goal: Absence of Infection Signs and Symptoms  Intervention: Prevent or Manage Infection  Recent Flowsheet Documentation  Taken 8/21/2024 0230 by Vinicio Alberto RN  Infection Prevention:   rest/sleep promoted   single patient room provided  Taken 8/20/2024 2234 by Vinicio Alberto RN  Infection Prevention:   rest/sleep promoted   single patient room provided     Problem: Postpartum (Vaginal Delivery)  Goal: Successful Parent Role Transition  Intervention: Support Parent Role Transition  Recent Flowsheet Documentation  Taken 8/21/2024 0230 by Vinicio Alberto RN  Supportive Measures: active listening utilized  Parent-Child Attachment Promotion: cue recognition promoted  Taken 8/20/2024 2234 by Vinicio Alberto RN  Supportive Measures: active listening utilized  Parent-Child Attachment Promotion: cue recognition promoted  Goal: Optimal Pain Control and Function  Intervention: Prevent or Manage Pain  Recent Flowsheet Documentation  Taken 8/21/2024 0250 by Vinicio Alberto RN  Pain Management Interventions: medication (see MAR)

## 2024-08-21 NOTE — DISCHARGE INSTRUCTIONS
"Postpartum Care at Home With Your Baby: Care Instructions  Overview     After childbirth (postpartum period), your body goes through many changes as you recover. In these weeks after delivery, try to take good care of yourself. Get rest whenever you can and accept help from others.  It may take 4 to 6 weeks to feel like yourself again, and possibly longer if you had a  birth. You may feel sore or very tired as you recover. After delivery, you may continue to have contractions as the uterus returns to the size it was before your pregnancy. You will also have some vaginal bleeding. And you may have pain around the vagina as you heal. Several days after delivery you may also have pain and swelling in your breasts as they fill with milk. There are things you can do at home to help ease these discomforts.  After childbirth, it's common to feel emotional. You may feel irritable, cry easily, and feel happy one minute and sad the next. This is called the \"baby blues.\" Hormone changes are one cause of these emotional changes. These feelings usually get better within a couple of weeks. If they don't, talk to your doctor or midwife.  In the first couple of weeks after you give birth, your doctor or midwife may want to check in with you and make a plan for follow-up care. You will likely have a complete postpartum visit in the first 3 months after delivery. At that time, your doctor or midwife will check on your recovery and see how you're doing. But if you have questions or concerns before then, you can always call your doctor or midwife.  Follow-up care is a key part of your treatment and safety. Be sure to make and go to all appointments, and call your doctor if you are having problems. It's also a good idea to know your test results and keep a list of the medicines you take.  How can you care for yourself at home?  Taking care of your body  Use pads instead of tampons for bleeding. After birth, you will have bloody " vaginal discharge. You may also pass some blood clots that shouldn't be bigger than an egg. Over the next 6 weeks or so, your bleeding should decrease a little every day and slowly change to a pinkish and then whitish discharge.  For cramps or mild pain, try an over-the-counter pain medicine, such as acetaminophen (Tylenol) or ibuprofen (Advil, Motrin). Read and follow all instructions on the label.  To ease pain around the vagina or from hemorrhoids:  Put ice or a cold pack on the area for 10 to 20 minutes at a time. Put a thin cloth between the ice and your skin.  Try sitting in a few inches of warm water (sitz bath) when you can or after bowel movements.  Clean yourself with a gentle squeeze of warm water from a bottle instead of wiping with toilet paper.  Use witch hazel or hemorrhoid pads (such as Tucks).  Try using a cold compress for sore and swollen breasts. And wear a supportive bra that fits.  Ease constipation by drinking plenty of fluids and eating high-fiber foods. Ask your doctor or midwife about over-the-counter stool softeners.  Activity  Rest when you can.  Ask for help from family or friends when you need it.  If you can, have another adult in your home for at least 2 or 3 days after birth.  When you feel ready, try to get some exercise every day. For many people, walking is a good choice. Don't do any heavy exercise until your doctor or midwife says it's okay.  Ask your doctor or midwife when it is okay to have vaginal sex.  If you don't want to get pregnant, talk to your doctor or midwife about birth control options. You can get pregnant even before your period returns. Also, you can get pregnant while you are breastfeeding.  Talk to your doctor or midwife if you want to get pregnant again. They can talk to you about when it is safe.  Emotional health  It's normal to have some sadness, anxiety, and mood swings after delivery. It may help to talk with a trusted friend or family member. You can  also call the Maternal Mental Health Hotline at 4-836-RRM-MAMA (1-172.458.4934) for support. If these mood changes last more than a couple of weeks, talk to your doctor or midwife.  When should you call for help?  Share this information with your partner, family, or a friend. They can help you watch for warning signs.  Call 911  anytime you think you may need emergency care. For example, call if:    You feel you cannot stop from hurting yourself, your baby, or someone else.     You passed out (lost consciousness).     You have chest pain, are short of breath, or cough up blood.     You have a seizure.   Where to get help 24 hours a day, 7 days a week   If you or someone you know talks about suicide, self-harm, a mental health crisis, a substance use crisis, or any other kind of emotional distress, get help right away. You can:    Call the Suicide and Crisis Lifeline at 988.     Call 0-663-614-TALK (1-400.868.7265).     Text HOME to 090893 to access the Crisis Text Line.   Consider saving these numbers in your phone.  Go to [employer] for more information or to chat online.  Call your doctor or midwife now or seek immediate medical care if:    You have signs of hemorrhage (too much bleeding), such as:  Heavy vaginal bleeding. This means that you are soaking through one or more pads in an hour. Or you pass blood clots bigger than an egg.  Feeling dizzy or lightheaded, or you feel like you may faint.  Feeling so tired or weak that you cannot do your usual activities.  A fast or irregular heartbeat.  New or worse belly pain.     You have signs of infection, such as:  A fever.  Increased pain, swelling, warmth, or redness from an incision or wound.  Frequent or painful urination or blood in your urine.  Vaginal discharge that smells bad.  New or worse belly pain.     You have symptoms of a blood clot in your leg (called a deep vein thrombosis), such as:  Pain in the calf, back of the knee, thigh, or groin.  Swelling  "in the leg or groin.  A color change on the leg or groin. The skin may be reddish or purplish, depending on your usual skin color.     You have signs of preeclampsia, such as:  Sudden swelling of your face, hands, or feet.  New vision problems (such as dimness, blurring, or seeing spots).  A severe headache.     You have signs of heart failure, such as:  New or increased shortness of breath.  New or worse swelling in your legs, ankles, or feet.  Sudden weight gain, such as more than 2 to 3 pounds in a day or 5 pounds in a week.  Feeling so tired or weak that you cannot do your usual activities.     You had spinal or epidural pain relief and have:  New or worse back pain.  Increased pain, swelling, warmth, or redness at the injection site.  Tingling, weakness, or numbness in your legs or groin.   Watch closely for changes in your health, and be sure to contact your doctor or midwife if:    Your vaginal bleeding isn't decreasing.     You feel sad, anxious, or hopeless for more than a few days.     You are having problems with your breasts or breastfeeding.   Where can you learn more?  Go to https://www.[employer].net/patiented  Enter Z768 in the search box to learn more about \"Postpartum Care at Home With Your Baby: Care Instructions.\"  Current as of: July 10, 2023               Content Version: 14.0    0950-8083 [employer].   Care instructions adapted under license by your healthcare professional. If you have questions about a medical condition or this instruction, always ask your healthcare professional. [employer] disclaims any warranty or liability for your use of this information.      "

## 2024-08-21 NOTE — PLAN OF CARE
Goal Outcome Evaluation:      Plan of Care Reviewed With: patient, significant other    Overall Patient Progress: improvingOverall Patient Progress: improving    Outcome Evaluation: stable for DC    Vital signs within normal limits. Postpartum checks within normal limits - see flow record. Patient eating and drinking normally. Patient able to empty bladder independently and is up ambulating. Patient performing self cares, is able to care for infant and is formula feeding every 2-3 hours. Patient medicated with ibuprofen and tylenol during the shift for pain. See MAR. Adequate pain control noted by patient. Patient education done, see flow record. Positive attachment behaviors observed with infant. Patient's significant other present this shift.     Discharge instructions completed.  Patient states she understands all discharge instructions and all her questions have been answered.  Verbalizes when she needs to return to clinic for follow up for herself and baby.  She is caring for herself and her baby independently.  Prescriptions reviewed and sent to pharmacy.  Postpartum depression symptoms reviewed and encouraged frequent review of depression scale. Patient discharged home with family transporting at 1400.

## 2024-08-21 NOTE — PROGRESS NOTES
Patient unavailable when Public Health Nurse (PHN) stopped by to discuss [facility] ([facility]) resources.

## 2024-11-27 ENCOUNTER — HOSPITAL ENCOUNTER (EMERGENCY)
Facility: CLINIC | Age: 24
Discharge: HOME | End: 2024-11-27
Attending: STUDENT IN AN ORGANIZED HEALTH CARE EDUCATION/TRAINING PROGRAM
Payer: COMMERCIAL

## 2024-11-27 VITALS
TEMPERATURE: 98.7 F | OXYGEN SATURATION: 97 % | BODY MASS INDEX: 23.34 KG/M2 | SYSTOLIC BLOOD PRESSURE: 128 MMHG | HEIGHT: 70 IN | HEART RATE: 111 BPM | DIASTOLIC BLOOD PRESSURE: 81 MMHG | WEIGHT: 163 LBS | RESPIRATION RATE: 14 BRPM

## 2024-11-27 DIAGNOSIS — T78.2XXA ANAPHYLAXIS, INITIAL ENCOUNTER: Primary | ICD-10-CM

## 2024-11-27 DIAGNOSIS — L50.9 URTICARIA: ICD-10-CM

## 2024-11-27 PROCEDURE — 96375 TX/PRO/DX INJ NEW DRUG ADDON: CPT

## 2024-11-27 PROCEDURE — 96374 THER/PROPH/DIAG INJ IV PUSH: CPT

## 2024-11-27 PROCEDURE — 99284 EMERGENCY DEPT VISIT MOD MDM: CPT | Mod: 25

## 2024-11-27 PROCEDURE — 250N000011 HC RX IP 250 OP 636: Performed by: STUDENT IN AN ORGANIZED HEALTH CARE EDUCATION/TRAINING PROGRAM

## 2024-11-27 RX ORDER — EPINEPHRINE 0.3 MG/.3ML
0.3 INJECTION SUBCUTANEOUS
Qty: 2 EACH | Refills: 0 | Status: SHIPPED | OUTPATIENT
Start: 2024-11-27 | End: 2024-11-27

## 2024-11-27 RX ORDER — EPINEPHRINE 0.3 MG/.3ML
0.3 INJECTION SUBCUTANEOUS
Qty: 2 EACH | Refills: 0 | Status: SHIPPED | OUTPATIENT
Start: 2024-11-27

## 2024-11-27 RX ORDER — DIPHENHYDRAMINE HYDROCHLORIDE 50 MG/ML
50 INJECTION INTRAMUSCULAR; INTRAVENOUS ONCE
Status: COMPLETED | OUTPATIENT
Start: 2024-11-27 | End: 2024-11-27

## 2024-11-27 RX ADMIN — FAMOTIDINE 20 MG: 10 INJECTION, SOLUTION INTRAVENOUS at 20:21

## 2024-11-27 RX ADMIN — DIPHENHYDRAMINE HYDROCHLORIDE 50 MG: 50 INJECTION, SOLUTION INTRAMUSCULAR; INTRAVENOUS at 20:21

## 2024-11-27 ASSESSMENT — COLUMBIA-SUICIDE SEVERITY RATING SCALE - C-SSRS
2. HAVE YOU ACTUALLY HAD ANY THOUGHTS OF KILLING YOURSELF IN THE PAST MONTH?: NO
1. IN THE PAST MONTH, HAVE YOU WISHED YOU WERE DEAD OR WISHED YOU COULD GO TO SLEEP AND NOT WAKE UP?: NO
6. HAVE YOU EVER DONE ANYTHING, STARTED TO DO ANYTHING, OR PREPARED TO DO ANYTHING TO END YOUR LIFE?: NO

## 2024-11-27 ASSESSMENT — ACTIVITIES OF DAILY LIVING (ADL): ADLS_ACUITY_SCORE: 48

## 2024-11-28 NOTE — ED TRIAGE NOTES
Pt here with allergic reaction after having some macadamia nuts. Pt used epi pen on her way to ED. Pt's skin red with some facial swelling

## 2024-11-28 NOTE — ED PROVIDER NOTES
"  Emergency Department Note      History of Present Illness     Chief Complaint   Allergic Reaction      HPI   Lamberto Hernandez is a very pleasant 24 year old female with history of asthma and allergy to peanuts presenting with allergic reaction.  Patient ate a macadamia nut cookie at work and soon afterwards felt throat swelling, hives, facial swelling, abdominal discomfort, within about 10 minutes.  Patient did not have difficulty breathing.  No shortness of breath.  No nausea or vomiting.  Patient does have an EpiPen due to previous allergic reactions.  She used EpiPen approximately 1930.  Symptoms have improved since then.  She still has diffuse urticaria and itchiness.  Some history is provided by patient's colleague.    Independent Historian   Colleague as detailed above.    Review of External Notes   I personally reviewed notes from the patient's discharge summary dated  8/21/2024 . This provided me with information regarding patient's baseline medical problems.     I personally reviewed the patient's chart, including available medication list and available past medical history, past surgical history, family history, and social history.    Physical Exam     Patient Vitals for the past 24 hrs:   BP Temp Temp src Pulse Resp SpO2 Height Weight   11/27/24 2130 128/81 -- -- (!) 126 25 95 % -- --   11/27/24 2120 111/70 -- -- 91 15 96 % -- --   11/27/24 2110 -- -- -- 96 14 97 % -- --   11/27/24 2100 111/71 -- -- 96 20 95 % -- --   11/27/24 2050 118/71 -- -- 96 21 94 % -- --   11/27/24 2016 134/78 98.7  F (37.1  C) Oral (!) 156 24 100 % 1.778 m (5' 10\") 73.9 kg (163 lb)     Physical Exam  Vitals and nursing note reviewed.   Constitutional:       Appearance: She is not diaphoretic.   HENT:      Mouth/Throat:      Mouth: Mucous membranes are moist.      Pharynx: Oropharynx is clear.      Comments: There is mild swelling of the lips.  No oropharyngeal swelling.  Eyes:      Conjunctiva/sclera: Conjunctivae normal.      " Pupils: Pupils are equal, round, and reactive to light.   Cardiovascular:      Rate and Rhythm: Regular rhythm. Tachycardia present.   Pulmonary:      Effort: Pulmonary effort is normal.      Breath sounds: Normal breath sounds. No wheezing.   Abdominal:      Palpations: Abdomen is soft.   Skin:     General: Skin is warm and dry.      Findings: Rash (Diffuse urticaria) present.   Neurological:      Mental Status: She is alert and oriented to person, place, and time.           Diagnostics     Lab Results   Labs Ordered and Resulted from Time of ED Arrival to Time of ED Departure - No data to display    Imaging   No orders to display       EKG   No ECG performed.     Independent Interpretation - See ED Course Below    ED Course      Medications Administered   Medications   famotidine (PEPCID) injection 20 mg (20 mg Intravenous $Given 11/27/24 2021)   diphenhydrAMINE (BENADRYL) injection 50 mg (50 mg Intravenous $Given 11/27/24 2021)       Procedures   Procedures   None performed    Discussion of Management - See ED Course Below    ED Course   Independent Interpretation / Discussion of Management / Repeat Assessments       Additional Documentation  None    Medical Decision Making / Diagnosis     CMS Diagnoses: None    MIPS       None    MDM   Patient presents with allergic reaction.   Vital signs notable for tachycardia.  The patient got epinephrine.  With symptoms including urticaria, angioedema, pruritis, and abdominal cramping, the patient does meet criteria for anaphylaxis.   There was no evidence of anaphylactic shock.   IM epinephrine was given prior to arrival.   Additional dose(s) of IM epinephrine was not give here.   Anti-histamine was given for treatment of pruritis. No indication for treatment with steroids.   The patient was observed in the emergency department for 2 hours after last administration of epinephrine with cardiac monitoring and pulse oximetry.   The patient had no recurrence of symptoms and  felt comfortable being discharged.   I will provide a prescription for an Epi-Pen and advised the patient to follow-up with primary care clinician as needed.    Disposition   The patient was discharged.     Diagnosis     ICD-10-CM    1. Anaphylaxis, initial encounter  T78.2XXA       2. Urticaria  L50.9            Discharge Medications   New Prescriptions    EPINEPHRINE (ANY BX GENERIC EQUIV) 0.3 MG/0.3ML INJECTION 2-PACK    Inject 0.3 mLs (0.3 mg) into the muscle once as needed for anaphylaxis. May repeat one time in 5-15 minutes if response to initial dose is inadequate.          Carmelo Funes MD  11/27/24 [id number]

## 2025-03-29 ENCOUNTER — HEALTH MAINTENANCE LETTER (OUTPATIENT)
Age: 25
End: 2025-03-29